# Patient Record
Sex: FEMALE | Race: WHITE | NOT HISPANIC OR LATINO | URBAN - METROPOLITAN AREA
[De-identification: names, ages, dates, MRNs, and addresses within clinical notes are randomized per-mention and may not be internally consistent; named-entity substitution may affect disease eponyms.]

---

## 2020-11-06 ENCOUNTER — TRANSCRIBE ORDERS (OUTPATIENT)
Dept: ADMINISTRATIVE | Facility: HOSPITAL | Age: 60
End: 2020-11-06

## 2020-11-06 DIAGNOSIS — R55 SYNCOPE AND COLLAPSE: Primary | ICD-10-CM

## 2020-11-30 ENCOUNTER — HOSPITAL ENCOUNTER (OUTPATIENT)
Dept: NON INVASIVE DIAGNOSTICS | Facility: HOSPITAL | Age: 60
Discharge: HOME | End: 2020-11-30
Payer: COMMERCIAL

## 2020-11-30 DIAGNOSIS — R55 SYNCOPE AND COLLAPSE: ICD-10-CM

## 2020-11-30 PROCEDURE — 93306 TTE W/DOPPLER COMPLETE: CPT

## 2020-11-30 PROCEDURE — 93306 TTE W/DOPPLER COMPLETE: CPT | Performed by: INTERNAL MEDICINE

## 2021-04-05 ENCOUNTER — IMMUNIZATIONS (OUTPATIENT)
Dept: FAMILY MEDICINE CLINIC | Facility: HOSPITAL | Age: 61
End: 2021-04-05

## 2021-04-05 DIAGNOSIS — Z23 ENCOUNTER FOR IMMUNIZATION: Primary | ICD-10-CM

## 2021-04-05 PROCEDURE — 0011A SARS-COV-2 / COVID-19 MRNA VACCINE (MODERNA) 100 MCG: CPT

## 2021-04-05 PROCEDURE — 91301 SARS-COV-2 / COVID-19 MRNA VACCINE (MODERNA) 100 MCG: CPT

## 2021-05-14 ENCOUNTER — IMMUNIZATIONS (OUTPATIENT)
Dept: FAMILY MEDICINE CLINIC | Facility: HOSPITAL | Age: 61
End: 2021-05-14

## 2021-05-14 DIAGNOSIS — Z23 ENCOUNTER FOR IMMUNIZATION: Primary | ICD-10-CM

## 2021-05-14 PROCEDURE — 91301 SARS-COV-2 / COVID-19 MRNA VACCINE (MODERNA) 100 MCG: CPT

## 2021-05-14 PROCEDURE — 0012A SARS-COV-2 / COVID-19 MRNA VACCINE (MODERNA) 100 MCG: CPT

## 2023-02-17 ENCOUNTER — OFFICE VISIT (OUTPATIENT)
Dept: OBGYN CLINIC | Facility: CLINIC | Age: 63
End: 2023-02-17

## 2023-02-17 ENCOUNTER — APPOINTMENT (OUTPATIENT)
Dept: RADIOLOGY | Facility: CLINIC | Age: 63
End: 2023-02-17

## 2023-02-17 VITALS
WEIGHT: 137 LBS | BODY MASS INDEX: 22.82 KG/M2 | HEART RATE: 80 BPM | HEIGHT: 65 IN | DIASTOLIC BLOOD PRESSURE: 84 MMHG | SYSTOLIC BLOOD PRESSURE: 135 MMHG

## 2023-02-17 DIAGNOSIS — M25.551 RIGHT HIP PAIN: ICD-10-CM

## 2023-02-17 DIAGNOSIS — M16.11 PRIMARY OSTEOARTHRITIS OF RIGHT HIP: Primary | ICD-10-CM

## 2023-02-17 RX ORDER — TOPIRAMATE 50 MG/1
50 CAPSULE, EXTENDED RELEASE ORAL DAILY
COMMUNITY
Start: 2022-09-07

## 2023-02-17 RX ORDER — RIZATRIPTAN BENZOATE 10 MG/1
10 TABLET ORAL AS NEEDED
COMMUNITY
Start: 2022-11-16

## 2023-02-17 NOTE — LETTER
February 18, 2023     Alycia Howell DO  [address]    Patient: Tay Baum   YOB: 1960   Date of Visit: 2/17/2023       Dear Dr Vincenzo Dominique: Thank you for referring Miriam Lino to me for evaluation  Below are my notes for this consultation  If you have questions, please do not hesitate to call me  I look forward to following your patient along with you  Sincerely,        Anita Birch DO        CC: No Recipients  Anita Birch DO  2/18/2023  9:14 AM  Signed  Hip New Office Note    Assessment:    1  Primary osteoarthritis of right hip       Plan:  Findings today are consistent with osteoarthritis of the right hip  Imaging and prognosis was reviewed with the patient today  Discussed conservative treatment options in the form of cortisone steroid injection, anti-inflammatories, maintaining healthy body weight, and staying active vs surgical intervention in the form of total hip arthroplasty  Patient would like to proceed with conservative treatments  A referral for intra-articular injection with spine and pain was placed today  She can continue with OTC anti-inflammatories  She would benefit from low impact exercises in the form of flat surface walking, swimming and stationary biking  Patient will follow up in 2-3 months to check progress and response to injection  Problem List Items Addressed This Visit    None  Visit Diagnoses     Primary osteoarthritis of right hip    -  Primary    Relevant Orders    XR hip/pelv 2-3 vws right if performed    FL spine and pain procedure        Subjective:    Patient ID: Tay Baum is a 65 y o  female  Patient seen in consultation at the request of Dr Alycia Howell DO    Chief Complaint:  HPI:  The patient presents with a chief complaint of right hip pain  The pain began 2 month(s) ago and is not associated with an acute injury    Patient notes that several months ago she felt as her right leg was weaker than her left  Then in December she started experiencing right hip pain without any injury  The patient describes the pain as aching dull and sharp  It is constant in timing (worse at the night), and localizes the pain to deep with in her groin  She notes painful catching within the hip as well  The pain is worse with activities, sitting and squatting and relieved with rest, medication: Naproxen used and beneficial, avoiding painful activities, heat  Patient notes that she is active and is unable to complete activities she enjoys doing like hiking, yoga, lifting  Patient was treating with a chiropractor but symptoms were worsened  Patient denies any numbness and tingling  She denies any previous injuries or treatments to the right hip  Allergy:  Not on File  Medications:  all current active meds have been reviewed  Past Medical History:  History reviewed  No pertinent past medical history  Past Surgical History:  History reviewed  No pertinent surgical history  Family History:  History reviewed  No pertinent family history    Social History:  Social History     Substance and Sexual Activity   Alcohol Use None     Social History     Substance and Sexual Activity   Drug Use Not on file     Social History     Tobacco Use   Smoking Status Never   Smokeless Tobacco Never          ROS:  General: Per HPI  Skin: Negative, except if noted below  HEENT: Negative  Respiratory: Negative  Cardiovascular: Negative  Gastrointestinal: Negative  Urinary: Negative  Vascular: Negative  Musculoskeletal: Positive per HPI   Neurologic: Positive per HPI  Endocrine: Negative    Objective:  BP Readings from Last 1 Encounters:   02/17/23 135/84      Wt Readings from Last 1 Encounters:   02/17/23 62 1 kg (137 lb)        Respiratory:   non-labored respirations    Lymphatics:  no palpable lymph nodes    Gait and Station:   Antalgic     Neurologic:   Alert and oriented times 3  Patient with normal sensation except as noted below  Deep tendon reflexes 2+ except as noted in MSK exam    Bilateral Lower Extremity:  Right Hip     Inspection: skin intact     Range of Motion: limited with pain at end range external and internal rotation    + log roll    Motor: 5/5 IP/Q/HS/TA/GS    Pulses: 2+ DP / 2+ PT    SILT DP/SP/S/S/TN    Imaging:  My interpretation XR AP pelvis/ right hip: moderate-severe joint space narrowing, subchondral sclerosis, subchondral cysts, osteophyte formation  No fracture or dislocation  BMI:   Estimated body mass index is 22 8 kg/m² as calculated from the following:    Height as of this encounter: 5' 5" (1 651 m)  Weight as of this encounter: 62 1 kg (137 lb)  BSA:   Estimated body surface area is 1 68 meters squared as calculated from the following:    Height as of this encounter: 5' 5" (1 651 m)  Weight as of this encounter: 62 1 kg (137 lb)           Scribe Attestation    I,:  Paulina Horvath am acting as a scribe while in the presence of the attending physician :       I,:  Vernal Gottron, DO personally performed the services described in this documentation    as scribed in my presence :

## 2023-02-17 NOTE — PROGRESS NOTES
Hip New Office Note    Assessment:     1  Primary osteoarthritis of right hip        Plan:  Findings today are consistent with osteoarthritis of the right hip  Imaging and prognosis was reviewed with the patient today  Discussed conservative treatment options in the form of cortisone steroid injection, anti-inflammatories, maintaining healthy body weight, and staying active vs surgical intervention in the form of total hip arthroplasty  Patient would like to proceed with conservative treatments  A referral for intra-articular injection with spine and pain was placed today  She can continue with OTC anti-inflammatories  She would benefit from low impact exercises in the form of flat surface walking, swimming and stationary biking  Patient will follow up in 2-3 months to check progress and response to injection  Problem List Items Addressed This Visit    None  Visit Diagnoses     Primary osteoarthritis of right hip    -  Primary    Relevant Orders    XR hip/pelv 2-3 vws right if performed    FL spine and pain procedure         Subjective:     Patient ID: Michelle Britton is a 65 y o  female  Patient seen in consultation at the request of Dr Tai Owens DO    Chief Complaint:  HPI:  The patient presents with a chief complaint of right hip pain  The pain began 2 month(s) ago and is not associated with an acute injury  Patient notes that several months ago she felt as her right leg was weaker than her left  Then in December she started experiencing right hip pain without any injury  The patient describes the pain as aching dull and sharp  It is constant in timing (worse at the night), and localizes the pain to deep with in her groin  She notes painful catching within the hip as well  The pain is worse with activities, sitting and squatting and relieved with rest, medication: Naproxen used and beneficial, avoiding painful activities, heat   Patient notes that she is active and is unable to complete activities she enjoys doing like hiking, yoga, lifting  Patient was treating with a chiropractor but symptoms were worsened  Patient denies any numbness and tingling  She denies any previous injuries or treatments to the right hip  Allergy:  Not on File  Medications:  all current active meds have been reviewed  Past Medical History:  History reviewed  No pertinent past medical history  Past Surgical History:  History reviewed  No pertinent surgical history  Family History:  History reviewed  No pertinent family history  Social History:  Social History     Substance and Sexual Activity   Alcohol Use None     Social History     Substance and Sexual Activity   Drug Use Not on file     Social History     Tobacco Use   Smoking Status Never   Smokeless Tobacco Never           ROS:  General: Per HPI  Skin: Negative, except if noted below  HEENT: Negative  Respiratory: Negative  Cardiovascular: Negative  Gastrointestinal: Negative  Urinary: Negative  Vascular: Negative  Musculoskeletal: Positive per HPI   Neurologic: Positive per HPI  Endocrine: Negative    Objective:  BP Readings from Last 1 Encounters:   02/17/23 135/84      Wt Readings from Last 1 Encounters:   02/17/23 62 1 kg (137 lb)        Respiratory:   non-labored respirations    Lymphatics:  no palpable lymph nodes    Gait and Station:   Antalgic     Neurologic:   Alert and oriented times 3  Patient with normal sensation except as noted below  Deep tendon reflexes 2+ except as noted in MSK exam    Bilateral Lower Extremity:  Right Hip     Inspection: skin intact     Range of Motion: limited with pain at end range external and internal rotation    + log roll    Motor: 5/5 IP/Q/HS/TA/GS    Pulses: 2+ DP / 2+ PT    SILT DP/SP/S/S/TN    Imaging:  My interpretation XR AP pelvis/ right hip: moderate-severe joint space narrowing, subchondral sclerosis, subchondral cysts, osteophyte formation  No fracture or dislocation       BMI:   Estimated body mass index is 22 8 kg/m² as calculated from the following:    Height as of this encounter: 5' 5" (1 651 m)  Weight as of this encounter: 62 1 kg (137 lb)  BSA:   Estimated body surface area is 1 68 meters squared as calculated from the following:    Height as of this encounter: 5' 5" (1 651 m)  Weight as of this encounter: 62 1 kg (137 lb)             Scribe Attestation    I,:  Joey Quinones am acting as a scribe while in the presence of the attending physician :       I,:  Netta Lai, DO personally performed the services described in this documentation    as scribed in my presence :

## 2023-03-09 ENCOUNTER — HOSPITAL ENCOUNTER (OUTPATIENT)
Dept: RADIOLOGY | Facility: CLINIC | Age: 63
Discharge: HOME | End: 2023-03-09
Admitting: ANESTHESIOLOGY

## 2023-03-09 VITALS
HEART RATE: 68 BPM | DIASTOLIC BLOOD PRESSURE: 85 MMHG | RESPIRATION RATE: 20 BRPM | SYSTOLIC BLOOD PRESSURE: 148 MMHG | TEMPERATURE: 98.6 F | OXYGEN SATURATION: 98 %

## 2023-03-09 DIAGNOSIS — M16.11 PRIMARY OSTEOARTHRITIS OF RIGHT HIP: ICD-10-CM

## 2023-03-09 RX ORDER — TOPIRAMATE 100 MG/1
1 CAPSULE, EXTENDED RELEASE ORAL
COMMUNITY

## 2023-03-09 RX ORDER — CYCLOBENZAPRINE HCL 5 MG
TABLET ORAL
COMMUNITY
Start: 2023-03-02

## 2023-03-09 RX ORDER — CYCLOBENZAPRINE HCL 10 MG
1 TABLET ORAL 3 TIMES DAILY PRN
COMMUNITY
Start: 2015-10-07

## 2023-03-09 RX ORDER — DIAZEPAM 5 MG/1
5 TABLET ORAL
COMMUNITY

## 2023-03-09 RX ORDER — LORATADINE 10 MG/1
10 TABLET ORAL DAILY
COMMUNITY
Start: 2023-03-05

## 2023-03-09 RX ORDER — FROVATRIPTAN SUCCINATE 2.5 MG/1
TABLET, FILM COATED ORAL
COMMUNITY

## 2023-03-09 RX ORDER — NAPROXEN 500 MG/1
TABLET ORAL
COMMUNITY
Start: 2023-02-06

## 2023-03-09 RX ORDER — PRASTERONE 6.5 MG/1
INSERT VAGINAL
COMMUNITY
Start: 2023-02-21

## 2023-03-09 RX ORDER — METHYLPREDNISOLONE ACETATE 80 MG/ML
80 INJECTION, SUSPENSION INTRA-ARTICULAR; INTRALESIONAL; INTRAMUSCULAR; PARENTERAL; SOFT TISSUE ONCE
Status: COMPLETED | OUTPATIENT
Start: 2023-03-09 | End: 2023-03-09

## 2023-03-09 RX ORDER — HYDROCODONE BITARTRATE AND ACETAMINOPHEN 10; 325 MG/1; MG/1
1 TABLET ORAL EVERY 4 HOURS PRN
COMMUNITY
Start: 2015-10-07

## 2023-03-09 RX ADMIN — METHYLPREDNISOLONE ACETATE 80 MG: 80 INJECTION, SUSPENSION INTRA-ARTICULAR; INTRALESIONAL; INTRAMUSCULAR; SOFT TISSUE at 09:21

## 2023-03-09 RX ADMIN — IOHEXOL 1 ML: 300 INJECTION, SOLUTION INTRAVENOUS at 09:21

## 2023-03-09 RX ADMIN — LIDOCAINE HYDROCHLORIDE 2 ML: 20 INJECTION, SOLUTION EPIDURAL; INFILTRATION; INTRACAUDAL at 09:21

## 2023-03-09 NOTE — H&P
History of Present Illness: The patient is a 65 y o  female who presents with complaints of hip pain  No past medical history on file  No past surgical history on file  Current Outpatient Medications:   •  rizatriptan (MAXALT) 10 mg tablet, Take 10 mg by mouth as needed, Disp: , Rfl:   •  Topiramate ER (Trokendi XR) 50 MG CP24, Take 50 mg by mouth daily, Disp: , Rfl:     Current Facility-Administered Medications:   •  iohexol (OMNIPAQUE) 300 mg/mL injection 50 mL, 50 mL, Intra-articular, Once, Lawrence Corcoran DO  •  lidocaine (PF) (XYLOCAINE-MPF) 2 % injection 5 mL, 5 mL, Intra-articular, Once, Lawrence Corcoran DO  •  methylPREDNISolone acetate (DEPO-MEDROL) injection 80 mg, 80 mg, Intra-articular, Once, Barney New, DO    Not on File    Physical Exam:   General: Awake, Alert, Oriented x 3, Mood and affect appropriate  Respiratory: Respirations even and unlabored  Cardiovascular: Peripheral pulses intact; no edema  Musculoskeletal Exam: decreased range of motion right hip    ASA Score: II         Assessment:   1   Primary osteoarthritis of right hip        Plan: intra-articular cortisone injection right hip

## 2023-03-09 NOTE — DISCHARGE INSTRUCTIONS
Steroid Joint Injection   WHAT YOU NEED TO KNOW:   A steroid joint injection is a procedure to inject steroid medicine into a joint  Steroid medicine decreases pain and inflammation  The injection may also contain an anesthetic (numbing medicine) to decrease pain  It may be done to treat conditions such as arthritis, gout, or carpal tunnel syndrome  The injections may be given in your knee, ankle, shoulder, elbow, wrist, ankle or sacroiliac joint  Do not apply heat to any area that is numb  If you have discomfort or soreness at the injection site, you may apply ice today, 20 minutes on and 20 minutes off  Tomorrow you may use ice or warm, moist heat  Do not apply ice or heat directly to the skin  You may have an increase or change in the discomfort for 36-48 hours after your treatment  Apply ice and continue with any pain medicine you have been prescribed  Do not do anything strenuous today  You may shower, but no tub baths or hot tubs today  You may resume your normal activities tomorrow, but do not “overdo it”  Resume normal activities slowly when you are feeling better  If you experience redness, drainage or swelling at the injection site, or if you develop a fever above 100 degrees, please call [facility] at [phone] or go to the Emergency Room  Continue to take all routine medicines prescribed by your primary care physician unless otherwise instructed by our staff  Most blood thinners should be started again according to your regularly scheduled dosing  If you have any questions, please give our office a call  As no general anesthesia was used in today's procedure, you should not experience any side effects related to anesthesia  If you are diabetic, the steroids used in today's injection may temporarily increase your blood sugar levels after the first few days after your injection   Please keep a close eye on your sugars and alert the doctor who manages your diabetes if your sugars are significantly high from your baseline or you are symptomatic  If you have a problem specifically related to your procedure, please call our office at [phone]  Problems not related to your procedure should be directed to your primary care physician

## 2023-03-16 ENCOUNTER — TELEPHONE (OUTPATIENT)
Dept: PAIN MEDICINE | Facility: CLINIC | Age: 63
End: 2023-03-16

## 2023-03-29 NOTE — TELEPHONE ENCOUNTER
At this point since she is noting greater than 50% improvement, she can follow up with our office as needed and we can consider a repeat injection in 3-4 months if needed  Thank you

## 2023-03-30 NOTE — TELEPHONE ENCOUNTER
Caller: Ochoa Choudhury PT    Doctor: [address] [place] [address]    Reason for call: Pt f/u from procedure     Call back#: [phone] after 3 pm

## 2023-05-19 ENCOUNTER — OFFICE VISIT (OUTPATIENT)
Dept: OBGYN CLINIC | Facility: CLINIC | Age: 63
End: 2023-05-19

## 2023-05-19 VITALS
SYSTOLIC BLOOD PRESSURE: 144 MMHG | HEART RATE: 72 BPM | HEIGHT: 65 IN | WEIGHT: 137 LBS | BODY MASS INDEX: 22.82 KG/M2 | DIASTOLIC BLOOD PRESSURE: 86 MMHG

## 2023-05-19 DIAGNOSIS — Z53.21 PATIENT LEFT WITHOUT BEING SEEN: Primary | ICD-10-CM

## 2023-05-24 ENCOUNTER — OFFICE VISIT (OUTPATIENT)
Dept: URGENT CARE | Facility: CLINIC | Age: 63
End: 2023-05-24

## 2023-05-24 VITALS
TEMPERATURE: 98.6 F | BODY MASS INDEX: 22.49 KG/M2 | DIASTOLIC BLOOD PRESSURE: 82 MMHG | HEART RATE: 104 BPM | OXYGEN SATURATION: 99 % | WEIGHT: 135 LBS | RESPIRATION RATE: 16 BRPM | SYSTOLIC BLOOD PRESSURE: 144 MMHG | HEIGHT: 65 IN

## 2023-05-24 DIAGNOSIS — J40 BRONCHITIS: Primary | ICD-10-CM

## 2023-05-24 RX ORDER — AZITHROMYCIN 250 MG/1
TABLET, FILM COATED ORAL
Qty: 6 TABLET | Refills: 0 | Status: SHIPPED | OUTPATIENT
Start: 2023-05-24 | End: 2023-05-28

## 2023-05-24 RX ORDER — ALBUTEROL SULFATE 90 UG/1
2 AEROSOL, METERED RESPIRATORY (INHALATION) EVERY 6 HOURS PRN
Qty: 8.5 G | Refills: 0 | Status: SHIPPED | OUTPATIENT
Start: 2023-05-24

## 2023-05-24 RX ORDER — BENZONATATE 100 MG/1
100 CAPSULE ORAL 3 TIMES DAILY PRN
Qty: 20 CAPSULE | Refills: 0 | Status: SHIPPED | OUTPATIENT
Start: 2023-05-24

## 2023-05-24 NOTE — PROGRESS NOTES
"[facility]        NAME: Eva Harvey is a 65 y o  female  : 1960    MRN: [id number]  DATE: May 24, 2023  TIME: 8:20 AM    /82   Pulse 104   Temp 98 6 °F (37 °C)   Resp 16   Ht 5' 5\" (1 651 m)   Wt 61 2 kg (135 lb)   SpO2 99%   BMI 22 47 kg/m²     Assessment and Plan   Bronchitis [J40]  1  Bronchitis  azithromycin (ZITHROMAX) 250 mg tablet    benzonatate (TESSALON PERLES) 100 mg capsule    albuterol (ProAir HFA) 90 mcg/act inhaler            Patient Instructions       Follow up with PCP in 3-5 days  Proceed to  ER if symptoms worsen  Chief Complaint     Chief Complaint   Patient presents with   • Cough     Pt reports she had an asthma attack this morning while sitting  Reports coughing and difficulty catching her breath  Used her 's albuterol with improvement  Hx of asthma - needs albuterol refilled  History of Present Illness       Pt with 1 day of cough and some wheezing   Used spouse's albuterol which helped,  Pt has not used albuterol in years,  No chest pain       Review of Systems   Review of Systems   Constitutional: Negative  HENT: Positive for congestion  Eyes: Negative  Respiratory: Positive for cough  Cardiovascular: Negative  Gastrointestinal: Negative  Endocrine: Negative  Genitourinary: Negative  Musculoskeletal: Negative  Skin: Negative  Allergic/Immunologic: Negative  Neurological: Negative  Hematological: Negative  Psychiatric/Behavioral: Negative  All other systems reviewed and are negative          Current Medications       Current Outpatient Medications:   •  albuterol (ProAir HFA) 90 mcg/act inhaler, Inhale 2 puffs every 6 (six) hours as needed for wheezing, Disp: 8 5 g, Rfl: 0  •  azithromycin (ZITHROMAX) 250 mg tablet, Take 2 tablets today then 1 tablet daily x 4 days, Disp: 6 tablet, Rfl: 0  •  benzonatate (TESSALON PERLES) 100 mg capsule, Take 1 capsule (100 mg total) by mouth 3 (three) times a day " as needed for cough, Disp: 20 capsule, Rfl: 0  •  loratadine (CLARITIN) 10 mg tablet, Take 10 mg by mouth daily, Disp: , Rfl:   •  naproxen (NAPROSYN) 500 mg tablet, TAKE 1 TABLET BY MOUTH EVERY 8 HOURS AS NEEDED (MIGRAINE)  , Disp: , Rfl:   •  Topiramate ER (Trokendi XR) 50 MG CP24, Take 50 mg by mouth daily, Disp: , Rfl:   •  cyclobenzaprine (FLEXERIL) 10 mg tablet, Take 1 tablet by mouth 3 (three) times a day as needed (Patient not taking: Reported on 5/24/2023), Disp: , Rfl:   •  cyclobenzaprine (FLEXERIL) 5 mg tablet, TAKE 1 TABLET BY MOUTH EVERY 8 HOURS AS NEEDED FOR MUSCLE SPASM (Patient not taking: Reported on 5/24/2023), Disp: , Rfl:   •  diazepam (VALIUM) 5 mg tablet, Take 5 mg by mouth (Patient not taking: Reported on 5/24/2023), Disp: , Rfl:   •  frovatriptan (FROVA) 2 5 MG tablet, Take by mouth (Patient not taking: Reported on 5/24/2023), Disp: , Rfl:   •  HYDROcodone-acetaminophen (NORCO)  mg per tablet, Take 1 tablet by mouth every 4 (four) hours as needed, Disp: , Rfl:   •  Intrarosa 6 5 MG INST, INSERT 6 5 MG INTO THE VAGINA DAILY ( NOT COVERED MD AWARE ), Disp: , Rfl:   •  rizatriptan (MAXALT) 10 mg tablet, Take 10 mg by mouth as needed (Patient not taking: Reported on 5/24/2023), Disp: , Rfl:   •  Topiramate  MG CP24, 1 capsule (Patient not taking: Reported on 5/24/2023), Disp: , Rfl:     Current Allergies     Allergies as of 05/24/2023 - Reviewed 05/24/2023   Allergen Reaction Noted   • Moxifloxacin Hives 10/07/2015            The following portions of the patient's history were reviewed and updated as appropriate: allergies, current medications, past family history, past medical history, past social history, past surgical history and problem list      History reviewed  No pertinent past medical history  History reviewed  No pertinent surgical history  History reviewed  No pertinent family history  Medications have been verified          Objective   /82   Pulse 104 "Temp 98 6 °F (37 °C)   Resp 16   Ht 5' 5\" (1 651 m)   Wt 61 2 kg (135 lb)   SpO2 99%   BMI 22 47 kg/m²        Physical Exam     Physical Exam  Vitals and nursing note reviewed  Constitutional:       Appearance: Normal appearance  She is normal weight  Comments: Pt with no problems in office no sob no chest pain     Pt declines covid testing pt declines chest xray    HENT:      Head: Normocephalic and atraumatic  Right Ear: Tympanic membrane, ear canal and external ear normal       Left Ear: Tympanic membrane, ear canal and external ear normal       Nose: Nose normal       Mouth/Throat:      Mouth: Mucous membranes are moist       Pharynx: Oropharynx is clear  Eyes:      Extraocular Movements: Extraocular movements intact  Conjunctiva/sclera: Conjunctivae normal       Pupils: Pupils are equal, round, and reactive to light  Cardiovascular:      Rate and Rhythm: Normal rate and regular rhythm  Pulses: Normal pulses  Heart sounds: Normal heart sounds  Pulmonary:      Effort: Pulmonary effort is normal       Comments: Minor coarse sounds   Abdominal:      General: Abdomen is flat  Bowel sounds are normal       Palpations: Abdomen is soft  Musculoskeletal:         General: Normal range of motion  Cervical back: Normal range of motion  Skin:     General: Skin is warm  Capillary Refill: Capillary refill takes less than 2 seconds  Neurological:      General: No focal deficit present  Mental Status: She is alert and oriented to person, place, and time     Psychiatric:         Mood and Affect: Mood normal                    "

## 2023-10-27 ENCOUNTER — OFFICE VISIT (OUTPATIENT)
Dept: URGENT CARE | Facility: CLINIC | Age: 63
End: 2023-10-27
Payer: COMMERCIAL

## 2023-10-27 VITALS
DIASTOLIC BLOOD PRESSURE: 85 MMHG | HEART RATE: 98 BPM | SYSTOLIC BLOOD PRESSURE: 175 MMHG | RESPIRATION RATE: 20 BRPM | OXYGEN SATURATION: 96 %

## 2023-10-27 DIAGNOSIS — M25.551 RIGHT HIP PAIN: Primary | ICD-10-CM

## 2023-10-27 PROBLEM — K76.9 LIVER LESION: Status: ACTIVE | Noted: 2023-10-27

## 2023-10-27 PROBLEM — G43.909 MIGRAINE: Status: ACTIVE | Noted: 2023-10-27

## 2023-10-27 PROBLEM — M54.9 BACK PAIN: Status: ACTIVE | Noted: 2023-10-27

## 2023-10-27 PROBLEM — G43.119 INTRACTABLE MIGRAINE WITH AURA WITHOUT STATUS MIGRAINOSUS: Status: ACTIVE | Noted: 2021-12-09

## 2023-10-27 PROCEDURE — 99213 OFFICE O/P EST LOW 20 MIN: CPT

## 2023-10-27 PROCEDURE — 96372 THER/PROPH/DIAG INJ SC/IM: CPT

## 2023-10-27 RX ORDER — KETOROLAC TROMETHAMINE 30 MG/ML
30 INJECTION, SOLUTION INTRAMUSCULAR; INTRAVENOUS ONCE
Status: COMPLETED | OUTPATIENT
Start: 2023-10-27 | End: 2023-10-27

## 2023-10-27 RX ADMIN — KETOROLAC TROMETHAMINE 30 MG: 30 INJECTION, SOLUTION INTRAMUSCULAR; INTRAVENOUS at 19:47

## 2023-10-27 NOTE — PROGRESS NOTES
[facility]        NAME: Janay Domínguez is a 59 y.o. female  : 1960    MRN: [id number]  DATE: 2023  TIME: 7:47 PM    Assessment and Plan   Right hip pain [M25.551]  1. Right hip pain  ketorolac (TORADOL) injection 30 mg        Reviewed MRI result at [facility] 10/24/2023. Unable to locate note from Rheumatologist.    Patient Instructions   You were given a one time dose of Toradol to try to give you some pain relief. Call your Rheumatologist regarding the safety of stopping the steroid medication early and being able to take Naproxen instead. Proceed to ER if symptoms worsen. Chief Complaint     Chief Complaint   Patient presents with   • Hip Pain     Patient has right hip pain that has been ongoing since January, is coming in to see if a different anti-inflammatory can help alleviate some of her pain         History of Present Illness       Currently on tapering dose of long term methylprednisolone from rheumatologist. Asking if she can take Naproxen as she has 4 more days to go down to zero tablets. Review of Systems   Review of Systems   Respiratory:  Negative for cough and shortness of breath. Cardiovascular:  Negative for chest pain. Musculoskeletal:  Positive for arthralgias and myalgias.         Right hip         Current Medications       Current Outpatient Medications:   •  albuterol (ProAir HFA) 90 mcg/act inhaler, Inhale 2 puffs every 6 (six) hours as needed for wheezing, Disp: 8.5 g, Rfl: 0  •  benzonatate (TESSALON PERLES) 100 mg capsule, Take 1 capsule (100 mg total) by mouth 3 (three) times a day as needed for cough, Disp: 20 capsule, Rfl: 0  •  cyclobenzaprine (FLEXERIL) 10 mg tablet, Take 1 tablet by mouth 3 (three) times a day as needed (Patient not taking: Reported on 2023), Disp: , Rfl:   •  cyclobenzaprine (FLEXERIL) 5 mg tablet, TAKE 1 TABLET BY MOUTH EVERY 8 HOURS AS NEEDED FOR MUSCLE SPASM (Patient not taking: Reported on 2023), Disp: , Rfl:   •  diazepam (VALIUM) 5 mg tablet, Take 5 mg by mouth (Patient not taking: Reported on 5/24/2023), Disp: , Rfl:   •  frovatriptan (FROVA) 2.5 MG tablet, Take by mouth (Patient not taking: Reported on 5/24/2023), Disp: , Rfl:   •  HYDROcodone-acetaminophen (NORCO)  mg per tablet, Take 1 tablet by mouth every 4 (four) hours as needed, Disp: , Rfl:   •  Intrarosa 6.5 MG INST, INSERT 6.5 MG INTO THE VAGINA DAILY.( NOT COVERED MD AWARE ), Disp: , Rfl:   •  loratadine (CLARITIN) 10 mg tablet, Take 10 mg by mouth daily, Disp: , Rfl:   •  naproxen (NAPROSYN) 500 mg tablet, TAKE 1 TABLET BY MOUTH EVERY 8 HOURS AS NEEDED (MIGRAINE). , Disp: , Rfl:   •  rizatriptan (MAXALT) 10 mg tablet, Take 10 mg by mouth as needed (Patient not taking: Reported on 5/24/2023), Disp: , Rfl:   •  Topiramate ER (Trokendi XR) 50 MG CP24, Take 50 mg by mouth daily, Disp: , Rfl:   •  Topiramate  MG CP24, 1 capsule (Patient not taking: Reported on 5/24/2023), Disp: , Rfl:   No current facility-administered medications for this visit. Current Allergies     Allergies as of 10/27/2023 - Reviewed 10/27/2023   Allergen Reaction Noted   • Moxifloxacin Hives 10/07/2015            The following portions of the patient's history were reviewed and updated as appropriate: allergies, current medications, past family history, past medical history, past social history, past surgical history and problem list.     History reviewed. No pertinent past medical history. History reviewed. No pertinent surgical history. History reviewed. No pertinent family history. Medications have been verified. Objective   BP (!) 175/85   Pulse 98   Resp 20   SpO2 96%   No LMP recorded. Physical Exam     Physical Exam  Vitals and nursing note reviewed. Constitutional:       Appearance: Normal appearance. HENT:      Head: Normocephalic and atraumatic.    Pulmonary:      Effort: Pulmonary effort is normal.   Musculoskeletal: Right hip: Tenderness and bony tenderness present. Skin:     General: Skin is warm and dry. Capillary Refill: Capillary refill takes less than 2 seconds. Neurological:      General: No focal deficit present. Mental Status: She is alert and oriented to person, place, and time. Mental status is at baseline. Sensory: No sensory deficit. Motor: No weakness. Psychiatric:         Mood and Affect: Mood normal.         Behavior: Behavior normal.         Thought Content:  Thought content normal.

## 2023-10-27 NOTE — PATIENT INSTRUCTIONS
You were given a one time dose of Toradol to try to give you some pain relief. Call your Rheumatologist regarding the safety of stopping the steroid medication early and being able to take Naproxen instead. Proceed to ER if symptoms worsen.

## 2024-05-02 ENCOUNTER — OFFICE VISIT (OUTPATIENT)
Dept: URGENT CARE | Facility: CLINIC | Age: 64
End: 2024-05-02
Payer: COMMERCIAL

## 2024-05-02 ENCOUNTER — APPOINTMENT (OUTPATIENT)
Dept: RADIOLOGY | Facility: CLINIC | Age: 64
End: 2024-05-02
Payer: COMMERCIAL

## 2024-05-02 VITALS
HEART RATE: 102 BPM | BODY MASS INDEX: 22.47 KG/M2 | DIASTOLIC BLOOD PRESSURE: 82 MMHG | OXYGEN SATURATION: 97 % | RESPIRATION RATE: 16 BRPM | SYSTOLIC BLOOD PRESSURE: 136 MMHG | TEMPERATURE: 99.1 F | HEIGHT: 65 IN

## 2024-05-02 DIAGNOSIS — S79.911A INJURY OF RIGHT HIP, INITIAL ENCOUNTER: Primary | ICD-10-CM

## 2024-05-02 DIAGNOSIS — S79.911A INJURY OF RIGHT HIP, INITIAL ENCOUNTER: ICD-10-CM

## 2024-05-02 PROCEDURE — G0382 LEV 3 HOSP TYPE B ED VISIT: HCPCS | Performed by: PHYSICIAN ASSISTANT

## 2024-05-02 PROCEDURE — 73502 X-RAY EXAM HIP UNI 2-3 VIEWS: CPT

## 2024-05-02 PROCEDURE — S9083 URGENT CARE CENTER GLOBAL: HCPCS | Performed by: PHYSICIAN ASSISTANT

## 2024-05-02 RX ORDER — CELECOXIB 200 MG/1
200 CAPSULE ORAL DAILY
COMMUNITY
Start: 2024-04-24 | End: 2024-05-24

## 2024-05-02 RX ORDER — METHOCARBAMOL 500 MG/1
TABLET, FILM COATED ORAL
COMMUNITY
Start: 2024-04-24

## 2024-05-02 RX ORDER — ASPIRIN 81 MG/1
81 TABLET ORAL 2 TIMES DAILY
COMMUNITY
Start: 2024-04-24 | End: 2024-06-05

## 2024-05-02 NOTE — PROGRESS NOTES
[facility]        NAME: Sarai Ramirez is a 63 y.o. female  : 1960    MRN: [id number]  DATE: May 2, 2024  TIME: 10:43 AM    Assessment and Plan   Injury of right hip, initial encounter [S79.911A]  1. Injury of right hip, initial encounter  XR hip/pelv 2-3 vws right if performed            Patient Instructions       Follow up with PCP in 3-5 days.  Proceed to  ER if symptoms worsen.    If tests have been performed at [facility], our office will contact you with results if changes need to be made to the care plan discussed with you at the visit.  You can review your full results on [facility].    Chief Complaint     Chief Complaint   Patient presents with    Hip Pain     Pt reports hip replacement on 24. States fell yesterday from a standing position onto her bottom after losing balance from a dog. C/o pain when sitting and with weightbearing. States called surgeon and informed. Visiting PT suggested xray.           History of Present Illness       Patient presents with a fall onto her buttocks yesterday after her dog ran into her and she lost balance.  She started to fall backwards and her catcher and broke the fall little bit but she landed on her right buttocks on grass.  Has pain over the buttocks since then and it is worsened with sitting.  Denies any numbness/tingling down the leg, urinary or bowel incontinence, groin pain, or worsening pain around her incision.  Has an appointment with her orthopedist tomorrow.  Was told to come get an x-ray.    Hip Pain   Pertinent negatives include no numbness.       Review of Systems   Review of Systems   Genitourinary:  Negative for difficulty urinating.   Musculoskeletal:  Positive for arthralgias and gait problem.   Neurological:  Negative for weakness and numbness.         Current Medications       Current Outpatient Medications:     aspirin (ECOTRIN LOW STRENGTH) 81 mg EC tablet, Take 81 mg by mouth 2 (two) times a day, Disp: , Rfl:      celecoxib (CeleBREX) 200 mg capsule, Take 200 mg by mouth daily, Disp: , Rfl:     methocarbamol (ROBAXIN) 500 mg tablet, 1 tablet Orally Q6H prn muscle spasms s/p R THR 4/23/24 As needed, Disp: , Rfl:     rizatriptan (MAXALT) 10 mg tablet, Take 10 mg by mouth as needed, Disp: , Rfl:     Topiramate ER (Trokendi XR) 50 MG CP24, Take 50 mg by mouth daily, Disp: , Rfl:     Topiramate  MG CP24, 1 capsule, Disp: , Rfl:     albuterol (ProAir HFA) 90 mcg/act inhaler, Inhale 2 puffs every 6 (six) hours as needed for wheezing (Patient not taking: Reported on 5/2/2024), Disp: 8.5 g, Rfl: 0    benzonatate (TESSALON PERLES) 100 mg capsule, Take 1 capsule (100 mg total) by mouth 3 (three) times a day as needed for cough (Patient not taking: Reported on 5/2/2024), Disp: 20 capsule, Rfl: 0    cyclobenzaprine (FLEXERIL) 10 mg tablet, Take 1 tablet by mouth 3 (three) times a day as needed (Patient not taking: Reported on 5/24/2023), Disp: , Rfl:     cyclobenzaprine (FLEXERIL) 5 mg tablet, TAKE 1 TABLET BY MOUTH EVERY 8 HOURS AS NEEDED FOR MUSCLE SPASM (Patient not taking: Reported on 5/24/2023), Disp: , Rfl:     diazepam (VALIUM) 5 mg tablet, Take 5 mg by mouth (Patient not taking: Reported on 5/24/2023), Disp: , Rfl:     frovatriptan (FROVA) 2.5 MG tablet, Take by mouth (Patient not taking: Reported on 5/24/2023), Disp: , Rfl:     HYDROcodone-acetaminophen (NORCO)  mg per tablet, Take 1 tablet by mouth every 4 (four) hours as needed (Patient not taking: Reported on 5/2/2024), Disp: , Rfl:     Intrarosa 6.5 MG INST, INSERT 6.5 MG INTO THE VAGINA DAILY.( NOT COVERED MD AWARE ) (Patient not taking: Reported on 5/2/2024), Disp: , Rfl:     loratadine (CLARITIN) 10 mg tablet, Take 10 mg by mouth daily (Patient not taking: Reported on 5/2/2024), Disp: , Rfl:     naproxen (NAPROSYN) 500 mg tablet, TAKE 1 TABLET BY MOUTH EVERY 8 HOURS AS NEEDED (MIGRAINE). (Patient not taking: Reported on 5/2/2024), Disp: , Rfl:     Current  "Allergies     Allergies as of 05/02/2024 - Reviewed 05/02/2024   Allergen Reaction Noted    Moxifloxacin Hives 10/07/2015    Sulfa antibiotics Hives 02/05/2024            The following portions of the patient's history were reviewed and updated as appropriate: allergies, current medications, past family history, past medical history, past social history, past surgical history and problem list.     No past medical history on file.    No past surgical history on file.    No family history on file.      Medications have been verified.        Objective   /82 (BP Location: Right arm, Patient Position: Sitting)   Pulse 102   Temp 99.1 °F (37.3 °C)   Resp 16   Ht 5' 5\" (1.651 m)   SpO2 97%   BMI 22.47 kg/m²   No LMP recorded.       Physical Exam     Physical Exam  Constitutional:       Appearance: Normal appearance.   Musculoskeletal:         General: Tenderness present.   Skin:     General: Skin is warm.   Neurological:      Mental Status: She is alert.   Psychiatric:         Mood and Affect: Mood normal.         Behavior: Behavior normal.                   "

## 2024-05-02 NOTE — PATIENT INSTRUCTIONS
Keep your appointment with Ortho tomorrow.  If any new or worsening symptoms develop in the meantime proceed to the ER for further evaluation.  Radiologist reading of x-ray will be available later.

## 2024-09-19 ENCOUNTER — APPOINTMENT (EMERGENCY)
Dept: RADIOLOGY | Facility: HOSPITAL | Age: 64
End: 2024-09-19
Payer: COMMERCIAL

## 2024-09-19 ENCOUNTER — OFFICE VISIT (OUTPATIENT)
Dept: URGENT CARE | Facility: CLINIC | Age: 64
End: 2024-09-19
Payer: COMMERCIAL

## 2024-09-19 ENCOUNTER — APPOINTMENT (EMERGENCY)
Dept: CT IMAGING | Facility: HOSPITAL | Age: 64
End: 2024-09-19
Payer: COMMERCIAL

## 2024-09-19 ENCOUNTER — HOSPITAL ENCOUNTER (EMERGENCY)
Facility: HOSPITAL | Age: 64
Discharge: HOME | End: 2024-09-19
Attending: EMERGENCY MEDICINE
Payer: COMMERCIAL

## 2024-09-19 VITALS
SYSTOLIC BLOOD PRESSURE: 166 MMHG | HEART RATE: 83 BPM | OXYGEN SATURATION: 96 % | RESPIRATION RATE: 15 BRPM | TEMPERATURE: 98.8 F | DIASTOLIC BLOOD PRESSURE: 72 MMHG

## 2024-09-19 VITALS
SYSTOLIC BLOOD PRESSURE: 160 MMHG | HEART RATE: 82 BPM | DIASTOLIC BLOOD PRESSURE: 84 MMHG | RESPIRATION RATE: 16 BRPM | OXYGEN SATURATION: 98 % | TEMPERATURE: 99.8 F
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
## 2024-09-19 DIAGNOSIS — J06.9 VIRAL UPPER RESPIRATORY TRACT INFECTION: ICD-10-CM

## 2024-09-19 DIAGNOSIS — J40 BRONCHITIS: ICD-10-CM

## 2024-09-19 DIAGNOSIS — R51.9 ACUTE HEADACHE: Primary | ICD-10-CM

## 2024-09-19 DIAGNOSIS — R06.02 SHORTNESS OF BREATH: ICD-10-CM

## 2024-09-19 DIAGNOSIS — S09.90XA INJURY OF HEAD, INITIAL ENCOUNTER: Primary | ICD-10-CM

## 2024-09-19 LAB
ANION GAP SERPL CALCULATED.3IONS-SCNC: 8 MMOL/L (ref 4–13)
BASOPHILS # BLD AUTO: 0.06 THOUSANDS/ΜL (ref 0–0.1)
BASOPHILS NFR BLD AUTO: 1 % (ref 0–1)
BUN SERPL-MCNC: 14 MG/DL (ref 5–25)
CALCIUM SERPL-MCNC: 9.7 MG/DL (ref 8.4–10.2)
CARDIAC TROPONIN I PNL SERPL HS: <2 NG/L
CHLORIDE SERPL-SCNC: 105 MMOL/L (ref 96–108)
CO2 SERPL-SCNC: 24 MMOL/L (ref 21–32)
CREAT SERPL-MCNC: 0.86 MG/DL (ref 0.6–1.3)
EOSINOPHIL # BLD AUTO: 0.36 THOUSAND/ΜL (ref 0–0.61)
EOSINOPHIL NFR BLD AUTO: 6 % (ref 0–6)
ERYTHROCYTE [DISTWIDTH] IN BLOOD BY AUTOMATED COUNT: 13.4 % (ref 11.6–15.1)
FLUAV AG UPPER RESP QL IA.RAPID: NEGATIVE
FLUBV AG UPPER RESP QL IA.RAPID: NEGATIVE
GFR SERPL CREATININE-BSD FRML MDRD: 72 ML/MIN/1.73SQ M
GLUCOSE SERPL-MCNC: 101 MG/DL (ref 65–140)
HCT VFR BLD AUTO: 35.5 % (ref 34.8–46.1)
HGB BLD-MCNC: 12 G/DL (ref 11.5–15.4)
IMM GRANULOCYTES # BLD AUTO: 0.03 THOUSAND/UL (ref 0–0.2)
IMM GRANULOCYTES NFR BLD AUTO: 1 % (ref 0–2)
LYMPHOCYTES # BLD AUTO: 2.03 THOUSANDS/ΜL (ref 0.6–4.47)
LYMPHOCYTES NFR BLD AUTO: 32 % (ref 14–44)
MCH RBC QN AUTO: 30.8 PG (ref 26.8–34.3)
MCHC RBC AUTO-ENTMCNC: 33.8 G/DL (ref 31.4–37.4)
MCV RBC AUTO: 91 FL (ref 82–98)
MONOCYTES # BLD AUTO: 0.53 THOUSAND/ΜL (ref 0.17–1.22)
MONOCYTES NFR BLD AUTO: 8 % (ref 4–12)
NEUTROPHILS # BLD AUTO: 3.33 THOUSANDS/ΜL (ref 1.85–7.62)
NEUTS SEG NFR BLD AUTO: 52 % (ref 43–75)
NRBC BLD AUTO-RTO: 0 /100 WBCS
PLATELET # BLD AUTO: 272 THOUSANDS/UL (ref 149–390)
PMV BLD AUTO: 9.3 FL (ref 8.9–12.7)
POTASSIUM SERPL-SCNC: 3.7 MMOL/L (ref 3.5–5.3)
RBC # BLD AUTO: 3.89 MILLION/UL (ref 3.81–5.12)
SARS-COV+SARS-COV-2 AG RESP QL IA.RAPID: NEGATIVE
SODIUM SERPL-SCNC: 137 MMOL/L (ref 135–147)
WBC # BLD AUTO: 6.34 THOUSAND/UL (ref 4.31–10.16)

## 2024-09-19 PROCEDURE — 96365 THER/PROPH/DIAG IV INF INIT: CPT

## 2024-09-19 PROCEDURE — S9083 URGENT CARE CENTER GLOBAL: HCPCS | Performed by: PHYSICIAN ASSISTANT

## 2024-09-19 PROCEDURE — 85025 COMPLETE CBC W/AUTO DIFF WBC: CPT | Performed by: EMERGENCY MEDICINE

## 2024-09-19 PROCEDURE — G0382 LEV 3 HOSP TYPE B ED VISIT: HCPCS | Performed by: PHYSICIAN ASSISTANT

## 2024-09-19 PROCEDURE — 71045 X-RAY EXAM CHEST 1 VIEW: CPT

## 2024-09-19 PROCEDURE — 93005 ELECTROCARDIOGRAM TRACING: CPT

## 2024-09-19 PROCEDURE — 96375 TX/PRO/DX INJ NEW DRUG ADDON: CPT

## 2024-09-19 PROCEDURE — 36415 COLL VENOUS BLD VENIPUNCTURE: CPT | Performed by: EMERGENCY MEDICINE

## 2024-09-19 PROCEDURE — 87811 SARS-COV-2 COVID19 W/OPTIC: CPT | Performed by: EMERGENCY MEDICINE

## 2024-09-19 PROCEDURE — 80048 BASIC METABOLIC PNL TOTAL CA: CPT | Performed by: EMERGENCY MEDICINE

## 2024-09-19 PROCEDURE — 70450 CT HEAD/BRAIN W/O DYE: CPT

## 2024-09-19 PROCEDURE — 72125 CT NECK SPINE W/O DYE: CPT

## 2024-09-19 PROCEDURE — 87804 INFLUENZA ASSAY W/OPTIC: CPT | Performed by: EMERGENCY MEDICINE

## 2024-09-19 PROCEDURE — 84484 ASSAY OF TROPONIN QUANT: CPT | Performed by: EMERGENCY MEDICINE

## 2024-09-19 PROCEDURE — 99285 EMERGENCY DEPT VISIT HI MDM: CPT | Performed by: EMERGENCY MEDICINE

## 2024-09-19 PROCEDURE — 99284 EMERGENCY DEPT VISIT MOD MDM: CPT

## 2024-09-19 RX ORDER — MAGNESIUM SULFATE HEPTAHYDRATE 40 MG/ML
2 INJECTION, SOLUTION INTRAVENOUS ONCE
Status: COMPLETED | OUTPATIENT
Start: 2024-09-19 | End: 2024-09-19

## 2024-09-19 RX ORDER — ALBUTEROL SULFATE 90 UG/1
2 INHALANT RESPIRATORY (INHALATION) EVERY 6 HOURS PRN
Qty: 8.5 G | Refills: 0 | Status: SHIPPED | OUTPATIENT
Start: 2024-09-19

## 2024-09-19 RX ORDER — KETOROLAC TROMETHAMINE 30 MG/ML
15 INJECTION, SOLUTION INTRAMUSCULAR; INTRAVENOUS ONCE
Status: COMPLETED | OUTPATIENT
Start: 2024-09-19 | End: 2024-09-19

## 2024-09-19 RX ADMIN — MAGNESIUM SULFATE HEPTAHYDRATE 2 G: 2 INJECTION, SOLUTION INTRAVENOUS at 13:14

## 2024-09-19 RX ADMIN — KETOROLAC TROMETHAMINE 15 MG: 30 INJECTION, SOLUTION INTRAMUSCULAR; INTRAVENOUS at 13:13

## 2024-09-19 NOTE — PROGRESS NOTES
"  [facility]        NAME: Sarai Ramirez is a 63 y.o. female  : 1960    MRN: [id number]  DATE: 2024  TIME: 11:46 AM    Assessment and Plan   Injury of head, initial encounter [S09.90XA]  1. Injury of head, initial encounter  Transfer to other facility      2. Viral upper respiratory tract infection          Patient's  to take her to ER.    Patient Instructions       Follow up with PCP in 3-5 days.  Proceed to  ER if symptoms worsen.    If tests have been performed at [facility], our office will contact you with results if changes need to be made to the care plan discussed with you at the visit.  You can review your full results on [facility].    Chief Complaint     Chief Complaint   Patient presents with    Cold Like Symptoms     Patient gave herself taltz injection on Monday. On Tuesday, patient started feeling fatigued, SOB, sneezing, coughing, post nasal drip.   Also, yesterday, patient fell and hit the back of head. Patient is not concerned about this fall.          History of Present Illness       Patient presents with 3 days ago developing \"asthma symptoms\". Has h/o asthma. Symptoms today including fatigue, headache, horrible taste in mouth, post nasal drip.  Took a covid test and it was negative.     Yesterday fell and hit behind the right ear. Fell forward  after gate she was leaning on fell and hit head behind left ear on cupboard. Takes aspirin daily. Denies any other blood thinners. Headache started today. States couldn't sleep last night.   Denies visual disturbances, chest pains, SOB, dyspnea, LOC, balance issues, memory difficulties, confusion, dizziness, vomiting, cough.         Review of Systems   Review of Systems   Constitutional:  Positive for fatigue. Negative for chills and fever.   HENT:  Positive for postnasal drip, rhinorrhea and sneezing. Negative for congestion, ear discharge, ear pain, sinus pressure, sinus pain and sore throat.    Eyes:  " Negative for photophobia, pain and visual disturbance.   Respiratory:  Positive for chest tightness and wheezing. Negative for cough and shortness of breath.    Cardiovascular:  Negative for chest pain and palpitations.   Gastrointestinal:  Negative for nausea and vomiting.   Musculoskeletal:  Negative for arthralgias, back pain, myalgias, neck pain and neck stiffness.   Skin:  Negative for wound.   Neurological:  Positive for headaches. Negative for dizziness, syncope, speech difficulty, weakness and numbness.   Psychiatric/Behavioral:  Negative for confusion and sleep disturbance.          Current Medications       Current Outpatient Medications:     albuterol (ProAir HFA) 90 mcg/act inhaler, Inhale 2 puffs every 6 (six) hours as needed for wheezing (Patient not taking: Reported on 5/2/2024), Disp: 8.5 g, Rfl: 0    aspirin (ECOTRIN LOW STRENGTH) 81 mg EC tablet, Take 81 mg by mouth 2 (two) times a day, Disp: , Rfl:     benzonatate (TESSALON PERLES) 100 mg capsule, Take 1 capsule (100 mg total) by mouth 3 (three) times a day as needed for cough (Patient not taking: Reported on 5/2/2024), Disp: 20 capsule, Rfl: 0    celecoxib (CeleBREX) 200 mg capsule, Take 200 mg by mouth daily, Disp: , Rfl:     cyclobenzaprine (FLEXERIL) 10 mg tablet, Take 1 tablet by mouth 3 (three) times a day as needed (Patient not taking: Reported on 5/24/2023), Disp: , Rfl:     cyclobenzaprine (FLEXERIL) 5 mg tablet, TAKE 1 TABLET BY MOUTH EVERY 8 HOURS AS NEEDED FOR MUSCLE SPASM (Patient not taking: Reported on 5/24/2023), Disp: , Rfl:     diazepam (VALIUM) 5 mg tablet, Take 5 mg by mouth (Patient not taking: Reported on 5/24/2023), Disp: , Rfl:     frovatriptan (FROVA) 2.5 MG tablet, Take by mouth (Patient not taking: Reported on 5/24/2023), Disp: , Rfl:     HYDROcodone-acetaminophen (NORCO)  mg per tablet, Take 1 tablet by mouth every 4 (four) hours as needed (Patient not taking: Reported on 5/2/2024), Disp: , Rfl:     Intrarosa 6.5  MG INST, INSERT 6.5 MG INTO THE VAGINA DAILY.( NOT COVERED MD AWARE ) (Patient not taking: Reported on 5/2/2024), Disp: , Rfl:     loratadine (CLARITIN) 10 mg tablet, Take 10 mg by mouth daily (Patient not taking: Reported on 5/2/2024), Disp: , Rfl:     methocarbamol (ROBAXIN) 500 mg tablet, 1 tablet Orally Q6H prn muscle spasms s/p R THR 4/23/24 As needed, Disp: , Rfl:     naproxen (NAPROSYN) 500 mg tablet, TAKE 1 TABLET BY MOUTH EVERY 8 HOURS AS NEEDED (MIGRAINE). (Patient not taking: Reported on 5/2/2024), Disp: , Rfl:     rizatriptan (MAXALT) 10 mg tablet, Take 10 mg by mouth as needed, Disp: , Rfl:     Topiramate ER (Trokendi XR) 50 MG CP24, Take 50 mg by mouth daily, Disp: , Rfl:     Topiramate  MG CP24, 1 capsule, Disp: , Rfl:     Current Allergies     Allergies as of 09/19/2024 - Reviewed 09/19/2024   Allergen Reaction Noted    Moxifloxacin Hives 10/07/2015    Sulfa antibiotics Hives 02/05/2024            The following portions of the patient's history were reviewed and updated as appropriate: allergies, current medications, past family history, past medical history, past social history, past surgical history and problem list.     No past medical history on file.    No past surgical history on file.    No family history on file.      Medications have been verified.        Objective   /84   Pulse 82   Temp 99.8 °F (37.7 °C)   Resp 16   SpO2 98%   No LMP recorded.       Physical Exam     Physical Exam  Constitutional:       General: She is not in acute distress.     Appearance: Normal appearance. She is not ill-appearing or diaphoretic.   HENT:      Head:        Comments: Brown discoloration of skin behind right ear stopping at the hairline.  Patient states she did just color her hair recently.  Tenderness to palpation noted over this area as well.     Right Ear: Tympanic membrane, ear canal and external ear normal.      Left Ear: Tympanic membrane, ear canal and external ear normal.      Nose:  Nose normal.      Mouth/Throat:      Mouth: Mucous membranes are moist.      Pharynx: Oropharynx is clear.   Eyes:      Extraocular Movements: Extraocular movements intact.      Conjunctiva/sclera: Conjunctivae normal.      Pupils: Pupils are equal, round, and reactive to light.   Cardiovascular:      Rate and Rhythm: Normal rate and regular rhythm.      Heart sounds: Normal heart sounds.   Pulmonary:      Effort: Pulmonary effort is normal.      Breath sounds: Normal breath sounds.   Musculoskeletal:      Cervical back: Normal range of motion. No tenderness.   Skin:     General: Skin is warm and dry.   Neurological:      General: No focal deficit present.      Mental Status: She is alert and oriented to person, place, and time.      Cranial Nerves: Cranial nerves 2-12 are intact.      Sensory: Sensation is intact.      Motor: Motor function is intact.      Coordination: Coordination is intact.      Gait: Gait is intact.      Deep Tendon Reflexes: Reflexes are normal and symmetric.   Psychiatric:         Mood and Affect: Mood normal.         Behavior: Behavior normal.

## 2024-09-19 NOTE — ED PROVIDER NOTES
Emergency Department Trauma Note  Sarai Ramirez 63 y.o. female MRN: [id number]  Unit/Bed#: ED TR13/TR13B Encounter: [id number]      Trauma Alert: Trauma Acuity: Trauma Evaluation  Model of Arrival: Mode of Arrival: Direct from scene via    Trauma Team: Current Providers  Attending Provider: Rizwana Shahid MD  Registered Nurse: Lamberto Loza RN  Consultants:     None      History of Present Illness     Chief Complaint:   Chief Complaint   Patient presents with    Fall     Patient reports to ED c/o fall yesterday with head strike, daily aspirin. Patient was leaning over dog gate that was not secured and it gave out, striking head on oak hutch in dining room. No LOC. Was unable to sleep last night, headache starting today.      HPI:  Sarai Ramirez is a 63 y.o. female who presents with headache after head strike on cabinet.  Mechanism:Details of Incident: forward facing fall, headstrike Injury Date: 09/18/24        63 year old female presents for evaluation of headache, right sided neck pain, shortness of breath and nausea.  She reports that at 5:30 am yesterday morning, she fell and struck the right posterior scalp and right side of her neck on a cabinet.  She denies LOC.  No other injuries.  She began to feel short of breath throughout the day which she had attributed to her asthma which flares sporadically; however, she had not been wheezing.  She denies cough or congestion, but began to have chills.  This morning, she began having a bilateral frontal headache for which she took tylenol with no significant improvement.  Patient was concerned she may be getting sick so took a home COVID test which was negative.  She then went to urgent care where she was advised to come to the ED.  Patient takes aspirin.  No anticoagulation.      Fall    Review of Systems    Historical Information     Immunizations:   Immunization History   Administered Date(s) Administered    COVID-19 MODERNA VACC 0.5 ML IM 04/05/2021,  2021       History reviewed. No pertinent past medical history.  History reviewed. No pertinent family history.  Past Surgical History:   Procedure Laterality Date    JOINT REPLACEMENT       Social History     Tobacco Use    Smoking status: Never    Smokeless tobacco: Never     E-Cigarette/Vaping     E-Cigarette/Vaping Substances       Family History: non-contributory    Meds/Allergies   Prior to Admission Medications   Prescriptions Last Dose Informant Patient Reported? Taking?   HYDROcodone-acetaminophen (NORCO)  mg per tablet   Yes No   Sig: Take 1 tablet by mouth every 4 (four) hours as needed   Patient not taking: Reported on 2024   Intrarosa 6.5 MG INST   Yes No   Sig: INSERT 6.5 MG INTO THE VAGINA DAILY.( NOT COVERED MD AWARE )   Patient not taking: Reported on 2024   Topiramate ER (Trokendi XR) 50 MG CP24   Yes No   Sig: Take 50 mg by mouth daily   Topiramate  MG CP24   Yes No   Si capsule   albuterol (ProAir HFA) 90 mcg/act inhaler   No No   Sig: Inhale 2 puffs every 6 (six) hours as needed for wheezing   Patient not taking: Reported on 2024   albuterol (ProAir HFA) 90 mcg/act inhaler   No Yes   Sig: Inhale 2 puffs every 6 (six) hours as needed for wheezing   aspirin (ECOTRIN LOW STRENGTH) 81 mg EC tablet   Yes No   Sig: Take 81 mg by mouth 2 (two) times a day   benzonatate (TESSALON PERLES) 100 mg capsule   No No   Sig: Take 1 capsule (100 mg total) by mouth 3 (three) times a day as needed for cough   Patient not taking: Reported on 2024   celecoxib (CeleBREX) 200 mg capsule   Yes No   Sig: Take 200 mg by mouth daily   cyclobenzaprine (FLEXERIL) 10 mg tablet   Yes No   Sig: Take 1 tablet by mouth 3 (three) times a day as needed   Patient not taking: Reported on 2023   cyclobenzaprine (FLEXERIL) 5 mg tablet   Yes No   Sig: TAKE 1 TABLET BY MOUTH EVERY 8 HOURS AS NEEDED FOR MUSCLE SPASM   Patient not taking: Reported on 2023   diazepam (VALIUM) 5 mg tablet    Yes No   Sig: Take 5 mg by mouth   Patient not taking: Reported on 2023   frovatriptan (FROVA) 2.5 MG tablet   Yes No   Sig: Take by mouth   Patient not taking: Reported on 2023   loratadine (CLARITIN) 10 mg tablet   Yes No   Sig: Take 10 mg by mouth daily   Patient not taking: Reported on 2024   methocarbamol (ROBAXIN) 500 mg tablet   Yes No   Si tablet Orally Q6H prn muscle spasms s/p R THR 24  As needed   naproxen (NAPROSYN) 500 mg tablet   Yes No   Sig: TAKE 1 TABLET BY MOUTH EVERY 8 HOURS AS NEEDED (MIGRAINE).   Patient not taking: Reported on 2024   rizatriptan (MAXALT) 10 mg tablet   Yes No   Sig: Take 10 mg by mouth as needed      Facility-Administered Medications: None       Allergies   Allergen Reactions    Moxifloxacin Hives    Sulfa Antibiotics Hives       PHYSICAL EXAM    PE limited by: none    Objective   Vitals:   First set: Temperature: 98.8 °F (37.1 °C) (24 1214)  Pulse: 78 (24 1214)  Respirations: 18 (24 1214)  Blood Pressure: (!) 200/86 (24 1214)  SpO2: 98 % (24 121)    Primary Survey:   (A) Airway: patent  (B) Breathing: bilateral breath sounds  (C) Circulation: Pulses:   normal  (D) Disabliity:  GCS Total:  15  (E) Expose:  Completed    Secondary Survey: (Click on Physical Exam tab above)  Physical Exam  Vitals and nursing note reviewed.   HENT:      Head: Normocephalic.        Comments: No palpable skull fracture or hematoma  Eyes:      Extraocular Movements: Extraocular movements intact.      Conjunctiva/sclera: Conjunctivae normal.      Pupils: Pupils are equal, round, and reactive to light.   Cardiovascular:      Rate and Rhythm: Normal rate and regular rhythm.      Pulses: Normal pulses.      Heart sounds: Normal heart sounds.   Pulmonary:      Effort: Pulmonary effort is normal. No respiratory distress.      Breath sounds: Normal breath sounds.   Abdominal:      General: There is no distension.      Palpations: Abdomen is soft.       Tenderness: There is no abdominal tenderness.   Skin:     General: Skin is warm and dry.   Neurological:      Mental Status: She is alert.         Cervical spine cleared by clinical criteria? No (imaging required)      Invasive Devices       Peripheral Intravenous Line  Duration             Peripheral IV 09/19/24 Right Antecubital <1 day                    Lab Results:   Results Reviewed       Procedure Component Value Units Date/Time    HS Troponin 0hr (reflex protocol) [[id number]]  (Normal) Collected: 09/19/24 1230    Lab Status: Final result Specimen: Blood from Arm, Right Updated: 09/19/24 1258     hs TnI 0hr <2 ng/L     FLU/COVID Rapid Antigen (30 min. TAT) - Preferred screening test in ED [[id number]]  (Normal) Collected: 09/19/24 1233    Lab Status: Final result Specimen: Nares from Nose Updated: 09/19/24 1256     SARS COV Rapid Antigen Negative     Influenza A Rapid Antigen Negative     Influenza B Rapid Antigen Negative    Narrative:      This test has been performed using the Mobile [employer] Tiffanie 2 FLU+SARS Antigen test under the Emergency Use Authorization (EUA). This test has been validated by the  and verified by the performing laboratory. The Tiffanie uses lateral flow immunofluorescent sandwich assay to detect SARS-COV, Influenza A and Influenza B Antigen.     The [employer]idel Tiffanie 2 SARS Antigen test does not differentiate between SARS-CoV and SARS-CoV-2.     Negative results are presumptive and may be confirmed with a molecular assay, if necessary, for patient management. Negative results do not rule out SARS-CoV-2 or influenza infection and should not be used as the sole basis for treatment or patient management decisions. A negative test result may occur if the level of antigen in a sample is below the limit of detection of this test.     Positive results are indicative of the presence of viral antigens, but do not rule out bacterial infection or co-infection with other viruses.     All test results  should be used as an adjunct to clinical observations and other information available to the provider.    FOR PEDIATRIC PATIENTS - copy/paste COVID Guidelines URL to browser: [email or web address]    Basic metabolic panel [[id number]] Collected: 09/19/24 1230    Lab Status: Final result Specimen: Blood from Arm, Right Updated: 09/19/24 1250     Sodium 137 mmol/L      Potassium 3.7 mmol/L      Chloride 105 mmol/L      CO2 24 mmol/L      ANION GAP 8 mmol/L      BUN 14 mg/dL      Creatinine 0.86 mg/dL      Glucose 101 mg/dL      Calcium 9.7 mg/dL      eGFR 72 ml/min/1.73sq m     Narrative:      National Kidney Disease Foundation guidelines for Chronic Kidney Disease (CKD):     Stage 1 with normal or high GFR (GFR > 90 mL/min/1.73 square meters)    Stage 2 Mild CKD (GFR = 60-89 mL/min/1.73 square meters)    Stage 3A Moderate CKD (GFR = 45-59 mL/min/1.73 square meters)    Stage 3B Moderate CKD (GFR = 30-44 mL/min/1.73 square meters)    Stage 4 Severe CKD (GFR = 15-29 mL/min/1.73 square meters)    Stage 5 End Stage CKD (GFR <15 mL/min/1.73 square meters)  Note: GFR calculation is accurate only with a steady state creatinine    CBC and differential [[id number]] Collected: 09/19/24 1230    Lab Status: Final result Specimen: Blood from Arm, Right Updated: 09/19/24 1236     WBC 6.34 Thousand/uL      RBC 3.89 Million/uL      Hemoglobin 12.0 g/dL      Hematocrit 35.5 %      MCV 91 fL      MCH 30.8 pg      MCHC 33.8 g/dL      RDW 13.4 %      MPV 9.3 fL      Platelets 272 Thousands/uL      nRBC 0 /100 WBCs      Segmented % 52 %      Immature Grans % 1 %      Lymphocytes % 32 %      Monocytes % 8 %      Eosinophils Relative 6 %      Basophils Relative 1 %      Absolute Neutrophils 3.33 Thousands/µL      Absolute Immature Grans 0.03 Thousand/uL      Absolute Lymphocytes 2.03 Thousands/µL      Absolute Monocytes 0.53 Thousand/µL      Eosinophils Absolute 0.36 Thousand/µL       Basophils Absolute 0.06 Thousands/µL                    Imaging Studies:   Direct to CT: No  TRAUMA - CT head wo contrast   Final Result by Quinn Bustillo MD (09/19 1346)      No acute intracranial abnormality.            The study was marked in EPIC for immediate notification.      Workstation performed: [id number]         TRAUMA - CT spine cervical wo contrast   Final Result by Quinn Bustillo MD (09/19 1345)      No cervical spine fracture or traumatic malalignment.      The study was marked in EPIC for immediate notification.                  Workstation performed: [id number]         XR Trauma chest portable   ED Interpretation by Rizwana Shahid MD (09/19 7546)   No acute pulmonary pathology      Final Result by Ama Steven MD (09/19 1330)      No acute cardiopulmonary disease.      No acute displaced fractures.      Workstation performed: [id number]               Procedures  ECG 12 Lead Documentation Only    Date/Time: 9/19/2024 12:47 PM    Performed by: Rizwana Shahid MD  Authorized by: Rizwana Shahid MD    Indications / Diagnosis:  Shortness of breath  Patient location:  ED  Previous ECG:     Previous ECG:  Unavailable  Interpretation:     Interpretation: normal    Rate:     ECG rate:  72    ECG rate assessment: normal    Rhythm:     Rhythm: sinus rhythm    Ectopy:     Ectopy: none    QRS:     QRS axis:  Normal    QRS intervals:  Normal  Conduction:     Conduction: normal    ST segments:     ST segments:  Normal  T waves:     T waves: normal             ED Course  ED Course as of 09/19/24 1355   Thu Sep 19, 2024   1251 Patient reports increase in headache after CT.  Review of CT shows no obvious ICH.  Toradol and magnesium ordered.           Medical Decision Making  63 year old female presents for evaluation of multiple complaints after striking her head on a cabinet.  No acute traumatic pathology identified on imaging.  Labs unremarkable.  CXR without infiltrate  to suggest pneumonia.  COVID/flu negative.  Headache improved after magnesium and toradol.  Cannot exclude concussion vs early viral illness.  PCP follow up.      Amount and/or Complexity of Data Reviewed  Labs: ordered.  Radiology: ordered and independent interpretation performed.    Risk  Prescription drug management.                Disposition  [facility] Transfer: No  Final diagnoses:   Acute headache   Fall, initial encounter   Shortness of breath     Time reflects when diagnosis was documented in both MDM as applicable and the Disposition within this note       Time User Action Codes Description Comment    9/19/2024  1:49 PM Rizwana Shahid Add [R51.9] Acute headache     9/19/2024  1:50 PM Rizwana Shahid Add [W19.XXXA] Fall, initial encounter     9/19/2024  1:50 PM Rizwana Shahid Add [R06.02] Shortness of breath     9/19/2024  1:50 PM Rizwana Shahid Add [J40] Bronchitis           ED Disposition       ED Disposition   Discharge    Condition   Stable    Date/Time   Thu Sep 19, 2024  1:49 PM    Comment   Sarai Ramirez discharge to home/self care.                   Follow-up Information       Follow up With Specialties Details Why Contact Info Additional Information    Gaby aNvarrete DO Family Medicine Schedule an appointment as soon as possible for a visit in 1 week for re-evaluation [address] [place] [id number]  [phone]        [facility] Emergency Department Emergency Medicine Go to  If symptoms worsen [address] [facility] [address] [phone] [facility] Emergency Department, [address] [place], [place] [address]          Current Discharge Medication List        CONTINUE these medications which have CHANGED    Details   albuterol (ProAir HFA) 90 mcg/act inhaler Inhale 2 puffs every 6 (six) hours as needed for wheezing  Qty: 8.5 g, Refills: 0    Comments: Substitution to a formulary  equivalent within the same pharmaceutical class is authorized.  Associated Diagnoses: Bronchitis           CONTINUE these medications which have NOT CHANGED    Details   aspirin (ECOTRIN LOW STRENGTH) 81 mg EC tablet Take 81 mg by mouth 2 (two) times a day      benzonatate (TESSALON PERLES) 100 mg capsule Take 1 capsule (100 mg total) by mouth 3 (three) times a day as needed for cough  Qty: 20 capsule, Refills: 0    Associated Diagnoses: Bronchitis      celecoxib (CeleBREX) 200 mg capsule Take 200 mg by mouth daily      !! cyclobenzaprine (FLEXERIL) 10 mg tablet Take 1 tablet by mouth 3 (three) times a day as needed      !! cyclobenzaprine (FLEXERIL) 5 mg tablet TAKE 1 TABLET BY MOUTH EVERY 8 HOURS AS NEEDED FOR MUSCLE SPASM      diazepam (VALIUM) 5 mg tablet Take 5 mg by mouth      frovatriptan (FROVA) 2.5 MG tablet Take by mouth      HYDROcodone-acetaminophen (NORCO)  mg per tablet Take 1 tablet by mouth every 4 (four) hours as needed      Intrarosa 6.5 MG INST INSERT 6.5 MG INTO THE VAGINA DAILY.( NOT COVERED MD AWARE )      loratadine (CLARITIN) 10 mg tablet Take 10 mg by mouth daily      methocarbamol (ROBAXIN) 500 mg tablet 1 tablet Orally Q6H prn muscle spasms s/p R THR 4/23/24  As needed      naproxen (NAPROSYN) 500 mg tablet TAKE 1 TABLET BY MOUTH EVERY 8 HOURS AS NEEDED (MIGRAINE).      rizatriptan (MAXALT) 10 mg tablet Take 10 mg by mouth as needed      !! Topiramate ER (Trokendi XR) 50 MG CP24 Take 50 mg by mouth daily      !! Topiramate  MG CP24 1 capsule       !! - Potential duplicate medications found. Please discuss with provider.        No discharge procedures on file.    PDMP Review       None            ED Provider  Electronically Signed by           Rizwana Shahid MD  09/19/24 2478

## 2024-09-27 LAB
ATRIAL RATE: 72 BPM
P AXIS: 79 DEGREES
PR INTERVAL: 146 MS
QRS AXIS: 83 DEGREES
QRSD INTERVAL: 78 MS
QT INTERVAL: 388 MS
QTC INTERVAL: 424 MS
T WAVE AXIS: 79 DEGREES
VENTRICULAR RATE: 72 BPM

## 2024-09-27 PROCEDURE — 93010 ELECTROCARDIOGRAM REPORT: CPT | Performed by: INTERNAL MEDICINE

## 2025-04-08 ENCOUNTER — OFFICE VISIT (OUTPATIENT)
Dept: URGENT CARE | Facility: CLINIC | Age: 65
End: 2025-04-08
Payer: COMMERCIAL

## 2025-04-08 VITALS
WEIGHT: 139 LBS | OXYGEN SATURATION: 98 % | DIASTOLIC BLOOD PRESSURE: 68 MMHG | HEART RATE: 85 BPM | SYSTOLIC BLOOD PRESSURE: 118 MMHG | BODY MASS INDEX: 23.16 KG/M2 | HEIGHT: 65 IN | TEMPERATURE: 99 F | RESPIRATION RATE: 16 BRPM

## 2025-04-08 DIAGNOSIS — J40 BRONCHITIS: Primary | ICD-10-CM

## 2025-04-08 PROCEDURE — G0382 LEV 3 HOSP TYPE B ED VISIT: HCPCS | Performed by: PHYSICIAN ASSISTANT

## 2025-04-08 PROCEDURE — S9083 URGENT CARE CENTER GLOBAL: HCPCS | Performed by: PHYSICIAN ASSISTANT

## 2025-04-08 RX ORDER — GABAPENTIN 300 MG/1
CAPSULE ORAL
COMMUNITY
Start: 2024-12-30

## 2025-04-08 RX ORDER — PREDNISONE 20 MG/1
40 TABLET ORAL DAILY
Qty: 10 TABLET | Refills: 0 | Status: SHIPPED | OUTPATIENT
Start: 2025-04-08 | End: 2025-04-13

## 2025-04-08 NOTE — PROGRESS NOTES
[facility]        NAME: Sarai Ramirez is a 64 y.o. female  : 1960    MRN: [id number]  DATE: 2025  TIME: 10:47 AM    Assessment and Plan   Bronchitis [J40]  1. Bronchitis  predniSONE 20 mg tablet        Discussed risk/benefits of steroids.  Would like steroids to be sent in.    Patient Instructions       Follow up with PCP in 3-5 days.  Proceed to  ER if symptoms worsen.    If tests have been performed at [facility], our office will contact you with results if changes need to be made to the care plan discussed with you at the visit.  You can review your full results on [facility].    Chief Complaint     Chief Complaint   Patient presents with    Asthma     States Dx asthma several months ago. States progression since. Reports increased symptoms after traveling last week. States was seen by PCP yesterday and had episode of coughing. Was prescribed Albuterol inhaler. States re-occurring episode this mornign with sob and coughing. States improvement with inhaler.          History of Present Illness       Patient presents with asthma attacks since getting 4th taltz injection in January.  Was taken off the medication and hasn't been started on something else.  Since then asthma has been getting worse.  States Hadnt gotten asthma attacks for years before the injections.   Went to [place] and then on the plane ride home had an asthma attack on the plane. Came back 5 days ago.   Was seen by her PCP yesterday and had an office attack in the office.  Eyes were watering as well. Was given an inhaler by pcp.  Then had another asthma attack again today.  Thinks lungs are inflamed.   Denies fevers, congestion, runny nose, coughing, chest pains.   The albuterol does resolve the flare ups.     Asthma  She complains of cough, shortness of breath and wheezing. Pertinent negatives include no chest pain, ear pain, fever, myalgias, postnasal drip, rhinorrhea or sore throat. Her past medical history  is significant for asthma.       Review of Systems   Review of Systems   Constitutional:  Negative for chills, fatigue and fever.   HENT:  Negative for congestion, ear discharge, ear pain, postnasal drip, rhinorrhea, sinus pressure, sinus pain and sore throat.    Respiratory:  Positive for cough, shortness of breath and wheezing. Negative for chest tightness.    Cardiovascular:  Negative for chest pain and palpitations.   Musculoskeletal:  Negative for arthralgias and myalgias.   Neurological:  Negative for weakness.   Psychiatric/Behavioral:  Negative for confusion.          Current Medications       Current Outpatient Medications:     gabapentin (NEURONTIN) 300 mg capsule, TAKE 1 CAPSULE BY MOUTH 3 TIMES DAILY AS DIRECTED, Disp: , Rfl:     predniSONE 20 mg tablet, Take 2 tablets (40 mg total) by mouth daily for 5 days, Disp: 10 tablet, Rfl: 0    albuterol (ProAir HFA) 90 mcg/act inhaler, Inhale 2 puffs every 6 (six) hours as needed for wheezing, Disp: 8.5 g, Rfl: 0    aspirin (ECOTRIN LOW STRENGTH) 81 mg EC tablet, Take 81 mg by mouth 2 (two) times a day, Disp: , Rfl:     benzonatate (TESSALON PERLES) 100 mg capsule, Take 1 capsule (100 mg total) by mouth 3 (three) times a day as needed for cough (Patient not taking: Reported on 4/8/2025), Disp: 20 capsule, Rfl: 0    celecoxib (CeleBREX) 200 mg capsule, Take 200 mg by mouth daily, Disp: , Rfl:     cyclobenzaprine (FLEXERIL) 10 mg tablet, Take 1 tablet by mouth 3 (three) times a day as needed (Patient not taking: Reported on 4/8/2025), Disp: , Rfl:     cyclobenzaprine (FLEXERIL) 5 mg tablet, TAKE 1 TABLET BY MOUTH EVERY 8 HOURS AS NEEDED FOR MUSCLE SPASM (Patient not taking: Reported on 4/8/2025), Disp: , Rfl:     diazepam (VALIUM) 5 mg tablet, Take 5 mg by mouth (Patient not taking: Reported on 4/8/2025), Disp: , Rfl:     frovatriptan (FROVA) 2.5 MG tablet, Take by mouth (Patient not taking: Reported on 4/8/2025), Disp: , Rfl:     HYDROcodone-acetaminophen (NORCO)  " mg per tablet, Take 1 tablet by mouth every 4 (four) hours as needed (Patient not taking: Reported on 4/8/2025), Disp: , Rfl:     Intrarosa 6.5 MG INST, INSERT 6.5 MG INTO THE VAGINA DAILY.( NOT COVERED MD AWARE ) (Patient not taking: Reported on 4/8/2025), Disp: , Rfl:     loratadine (CLARITIN) 10 mg tablet, Take 10 mg by mouth daily (Patient not taking: Reported on 4/8/2025), Disp: , Rfl:     methocarbamol (ROBAXIN) 500 mg tablet, 1 tablet Orally Q6H prn muscle spasms s/p R THR 4/23/24 As needed (Patient not taking: Reported on 4/8/2025), Disp: , Rfl:     naproxen (NAPROSYN) 500 mg tablet, TAKE 1 TABLET BY MOUTH EVERY 8 HOURS AS NEEDED (MIGRAINE). (Patient not taking: Reported on 4/8/2025), Disp: , Rfl:     rizatriptan (MAXALT) 10 mg tablet, Take 10 mg by mouth as needed, Disp: , Rfl:     Topiramate ER (Trokendi XR) 50 MG CP24, Take 50 mg by mouth daily, Disp: , Rfl:     Topiramate  MG CP24, 1 capsule, Disp: , Rfl:     Current Allergies     Allergies as of 04/08/2025 - Reviewed 04/08/2025   Allergen Reaction Noted    Ixekizumab Wheezing 04/08/2025    Moxifloxacin Hives 10/07/2015    Sulfa antibiotics Hives 02/05/2024            The following portions of the patient's history were reviewed and updated as appropriate: allergies, current medications, past family history, past medical history, past social history, past surgical history and problem list.     No past medical history on file.    Past Surgical History:   Procedure Laterality Date    JOINT REPLACEMENT         No family history on file.      Medications have been verified.        Objective   /68 (BP Location: Left arm, Patient Position: Sitting, Cuff Size: Standard)   Pulse 85   Temp 99 °F (37.2 °C) (Tympanic)   Resp 16   Ht 5' 5\" (1.651 m)   Wt 63 kg (139 lb)   SpO2 98%   BMI 23.13 kg/m²   No LMP recorded. Patient is postmenopausal.       Physical Exam     Physical Exam  Constitutional:       General: She is not in acute " distress.     Appearance: Normal appearance. She is not ill-appearing or diaphoretic.   HENT:      Right Ear: Tympanic membrane, ear canal and external ear normal.      Left Ear: Tympanic membrane, ear canal and external ear normal.      Nose: Nose normal.      Mouth/Throat:      Mouth: Mucous membranes are moist.      Pharynx: Oropharynx is clear.   Eyes:      Conjunctiva/sclera: Conjunctivae normal.   Cardiovascular:      Rate and Rhythm: Normal rate and regular rhythm.      Heart sounds: Normal heart sounds.   Pulmonary:      Effort: Pulmonary effort is normal.      Breath sounds: Normal breath sounds. No wheezing, rhonchi or rales.   Skin:     General: Skin is warm and dry.   Neurological:      Mental Status: She is alert.   Psychiatric:         Mood and Affect: Mood normal.         Behavior: Behavior normal.

## 2025-04-08 NOTE — PATIENT INSTRUCTIONS
Follow-up with your primary care provider in the next 3-5 days.  Any new or worsening symptoms develop proceed to the ER.